# Patient Record
Sex: FEMALE | Race: WHITE | Employment: STUDENT | URBAN - METROPOLITAN AREA
[De-identification: names, ages, dates, MRNs, and addresses within clinical notes are randomized per-mention and may not be internally consistent; named-entity substitution may affect disease eponyms.]

---

## 2017-05-05 PROBLEM — F32.A DEPRESSIVE DISORDER: Status: ACTIVE | Noted: 2017-05-05

## 2017-06-29 PROBLEM — F41.9 ANXIETY DISORDER: Status: ACTIVE | Noted: 2017-06-29

## 2017-11-28 ENCOUNTER — APPOINTMENT (OUTPATIENT)
Dept: LAB | Age: 22
End: 2017-11-28
Attending: INTERNAL MEDICINE
Payer: COMMERCIAL

## 2017-11-28 ENCOUNTER — OFFICE VISIT (OUTPATIENT)
Dept: FAMILY MEDICINE CLINIC | Facility: CLINIC | Age: 22
End: 2017-11-28

## 2017-11-28 VITALS
SYSTOLIC BLOOD PRESSURE: 118 MMHG | WEIGHT: 133 LBS | HEIGHT: 62 IN | HEART RATE: 88 BPM | BODY MASS INDEX: 24.48 KG/M2 | RESPIRATION RATE: 16 BRPM | DIASTOLIC BLOOD PRESSURE: 68 MMHG

## 2017-11-28 DIAGNOSIS — E53.8 VITAMIN B12 DEFICIENCY: ICD-10-CM

## 2017-11-28 DIAGNOSIS — E55.9 VITAMIN D DEFICIENCY: ICD-10-CM

## 2017-11-28 DIAGNOSIS — Z00.00 LABORATORY EXAMINATION ORDERED AS PART OF A ROUTINE GENERAL MEDICAL EXAMINATION: ICD-10-CM

## 2017-11-28 DIAGNOSIS — Z23 NEED FOR INFLUENZA VACCINATION: ICD-10-CM

## 2017-11-28 DIAGNOSIS — Z00.00 WELLNESS EXAMINATION: Primary | ICD-10-CM

## 2017-11-28 PROCEDURE — 82306 VITAMIN D 25 HYDROXY: CPT | Performed by: INTERNAL MEDICINE

## 2017-11-28 PROCEDURE — 90471 IMMUNIZATION ADMIN: CPT | Performed by: INTERNAL MEDICINE

## 2017-11-28 PROCEDURE — 90686 IIV4 VACC NO PRSV 0.5 ML IM: CPT | Performed by: INTERNAL MEDICINE

## 2017-11-28 PROCEDURE — 99385 PREV VISIT NEW AGE 18-39: CPT | Performed by: INTERNAL MEDICINE

## 2017-11-28 PROCEDURE — 82607 VITAMIN B-12: CPT | Performed by: INTERNAL MEDICINE

## 2017-11-28 PROCEDURE — 36415 COLL VENOUS BLD VENIPUNCTURE: CPT | Performed by: INTERNAL MEDICINE

## 2017-11-28 PROCEDURE — 80061 LIPID PANEL: CPT | Performed by: INTERNAL MEDICINE

## 2017-11-28 RX ORDER — MELATONIN
1000 DAILY
COMMUNITY

## 2017-11-28 NOTE — PROGRESS NOTES
[facility] Internal Medicine Office Note  Chief Complaint:   Patient presents with:  Wellness Visit  Imm/Inj: flu shot today                  HPI:   This is a 25year old female coming in for physical   HPI  She had low vit D and low b12     She for shortness of breath. Cardiovascular: Negative for chest pain. Gastrointestinal: Negative for diarrhea and constipation. Genitourinary: Negative for dysuria. Musculoskeletal: Negative. Neurological: Negative. Hematological: Negative. VACCINE QUAD PRESERVATIVE FREE 0.5 ML    Laboratory examination ordered as part of a routine general medical examination  -     LIPID PANEL;  Future        Orders Placed This Encounter      Vitamin B12      Vitamin D, 25-Hydroxy      Lipid Panel      Flu sh

## 2017-11-28 NOTE — PATIENT INSTRUCTIONS
Thank you for choosing Josh Gupta MD at [address]  To Do: [facility]  1. Blood work  2. Follow up with Dr. Nicholas Duane for pap smear Phone: [phone]    Effective 6/19/17 until November 2017  Due to [place] Teri is being moved. side effects or medication interactions.  It is your duty and for your safety to discuss with the pharmacist and our office with questions, and to notify us and stop treatment if problems arise, but know that our intention is that the benefits outweigh tho

## 2018-05-11 ENCOUNTER — OFFICE VISIT (OUTPATIENT)
Dept: INTERNAL MEDICINE CLINIC | Facility: CLINIC | Age: 23
End: 2018-05-11

## 2018-05-11 ENCOUNTER — APPOINTMENT (OUTPATIENT)
Dept: LAB | Age: 23
End: 2018-05-11
Attending: INTERNAL MEDICINE
Payer: COMMERCIAL

## 2018-05-11 VITALS
RESPIRATION RATE: 16 BRPM | WEIGHT: 131.75 LBS | HEART RATE: 98 BPM | HEIGHT: 62 IN | OXYGEN SATURATION: 99 % | DIASTOLIC BLOOD PRESSURE: 70 MMHG | SYSTOLIC BLOOD PRESSURE: 110 MMHG | TEMPERATURE: 100 F | BODY MASS INDEX: 24.24 KG/M2

## 2018-05-11 DIAGNOSIS — L30.9 DERMATITIS: ICD-10-CM

## 2018-05-11 DIAGNOSIS — E53.8 LOW VITAMIN B12 LEVEL: ICD-10-CM

## 2018-05-11 DIAGNOSIS — E55.9 HYPOVITAMINOSIS D: Primary | ICD-10-CM

## 2018-05-11 DIAGNOSIS — E55.9 HYPOVITAMINOSIS D: ICD-10-CM

## 2018-05-11 PROCEDURE — 82306 VITAMIN D 25 HYDROXY: CPT | Performed by: INTERNAL MEDICINE

## 2018-05-11 PROCEDURE — 82607 VITAMIN B-12: CPT | Performed by: INTERNAL MEDICINE

## 2018-05-11 PROCEDURE — 36415 COLL VENOUS BLD VENIPUNCTURE: CPT | Performed by: INTERNAL MEDICINE

## 2018-05-11 PROCEDURE — 99213 OFFICE O/P EST LOW 20 MIN: CPT | Performed by: INTERNAL MEDICINE

## 2018-05-11 NOTE — PROGRESS NOTES
[facility] Internal Medicine Office Note  Chief Complaint:   Patient presents with:  Breast Problem      HPI:   This is a 26year old female coming in for breast issue  HPI  Red skin adjacent to nipple   No discharge   Sometimes itchy   No breast BMI 24.10 kg/m²  Estimated body mass index is 24.1 kg/m² as calculated from the following:    Height as of this encounter: 62\". Weight as of this encounter: 131 lb 12 oz. Vital signs reviewed. Appears stated age, well groomed.   Physical Exam   Vitals of today.      Chris Young MD

## 2018-05-11 NOTE — PATIENT INSTRUCTIONS
Blood work     Apply hydrocortisone 1% to affected area once daily and avoid applying to areola or nipple   Can use Vanicream in addition     Dr. Nam Rad - breast specialist   [facility]  [address] [facility] [address], [address], [address] [place] [address] Christ  P

## 2018-06-08 ENCOUNTER — OFFICE VISIT (OUTPATIENT)
Dept: SURGERY | Facility: CLINIC | Age: 23
End: 2018-06-08

## 2018-06-08 VITALS
SYSTOLIC BLOOD PRESSURE: 125 MMHG | HEART RATE: 91 BPM | RESPIRATION RATE: 20 BRPM | HEIGHT: 62 IN | BODY MASS INDEX: 23.74 KG/M2 | WEIGHT: 129 LBS | OXYGEN SATURATION: 100 % | DIASTOLIC BLOOD PRESSURE: 78 MMHG

## 2018-06-08 DIAGNOSIS — R21 RASH: ICD-10-CM

## 2018-06-08 DIAGNOSIS — H16.111: Primary | ICD-10-CM

## 2018-06-08 PROBLEM — F33.41 MDD (MAJOR DEPRESSIVE DISORDER), RECURRENT, IN PARTIAL REMISSION (HCC): Status: ACTIVE | Noted: 2018-06-08

## 2018-06-08 PROCEDURE — 99244 OFF/OP CNSLTJ NEW/EST MOD 40: CPT | Performed by: SURGERY

## 2018-06-08 PROCEDURE — 88305 TISSUE EXAM BY PATHOLOGIST: CPT | Performed by: SURGERY

## 2018-06-08 PROCEDURE — 11100 BIOPSY OF SKIN LESION: CPT | Performed by: SURGERY

## 2018-06-11 ENCOUNTER — TELEPHONE (OUTPATIENT)
Dept: SURGERY | Facility: CLINIC | Age: 23
End: 2018-06-11

## 2018-06-11 NOTE — TELEPHONE ENCOUNTER
I contacted the patient to let her know \"biopsy is completely benign. If the area worsens, I would recommend Derm/PCP evaluation. Otherwise this should spontaneously resolve over time. \"  She verbalized understanding and will call prn.

## 2018-06-21 NOTE — PROGRESS NOTES
Breast Surgery New Patient Consultation    This is the first visit for this 26year old woman, referred by Dr. Lisset Becerra, who presents for evaluation of R nipple lesion.     History of Present Illness:   Ms. Tasneem Chatterjee is a 26year old woman who presents wi Grandmother 61   • Cancer Maternal Grandmother    • Cancer Maternal Grandfather    • Bipolar Disorder Paternal Aunt    • Suicide History Paternal Aunt    • Breast Cancer Paternal Aunt 48   • Breast Cancer Paternal Grandmother 48       She is not of [facility] the urine or vaginal/penile discharge. Skin:    The patient denies rash, itching, skin lesions, dry skin, change in skin color or change in moles.      Hematologic/Lymphatic:  The patient denies easily bruising or bleeding or persistent swollen glands or border show a 2 x 3 cm erythematous patchy nonblanching skin reaction. There is no skin dimpling with movement of the pectoralis. There is no nipple retraction. No nipple discharge can be elicited. The parenchyma is mildly nodular.  There are no dominant ma skin and subcutaneous tissues underlying the area of concern. A 3 mm punch biopsy was obtained and placed in formalin for pathological analysis. Direct pressure was used for hemostasis. A sterile dressing was applied.   She tolerated this well with no im

## 2018-06-29 ENCOUNTER — TELEPHONE (OUTPATIENT)
Dept: SURGERY | Facility: CLINIC | Age: 23
End: 2018-06-29

## 2018-06-29 NOTE — TELEPHONE ENCOUNTER
I let Conor Ponce Martinez know Dr Kavon Beach recommended Dr Leandra Sparks. They did get an appointment with another MD in the same group.    Will call if they need any other assistance

## 2018-06-29 NOTE — TELEPHONE ENCOUNTER
Pt's mother left a message asking for a referral for a dermatologist.   The discoloration has not resolved on her breast.

## 2019-05-01 ENCOUNTER — PATIENT OUTREACH (OUTPATIENT)
Dept: INTERNAL MEDICINE CLINIC | Facility: CLINIC | Age: 24
End: 2019-05-01

## 2019-08-15 PROBLEM — F33.9 EPISODE OF RECURRENT MAJOR DEPRESSIVE DISORDER (HCC): Status: ACTIVE | Noted: 2019-08-15

## (undated) NOTE — LETTER
Date: 5/1/2019      Dear Sakina Mortensen ,     Being proactive in the management of your health will help you to achieve and maintain a positive quality of life and help to minimize complications from chronic diseases.    Our records indicate that you may be overdue